# Patient Record
Sex: FEMALE | Race: WHITE | Employment: FULL TIME | URBAN - METROPOLITAN AREA
[De-identification: names, ages, dates, MRNs, and addresses within clinical notes are randomized per-mention and may not be internally consistent; named-entity substitution may affect disease eponyms.]

---

## 2018-05-28 ENCOUNTER — HOSPITAL ENCOUNTER (EMERGENCY)
Age: 23
Discharge: LEFT BEFORE DISCHARGE | End: 2018-05-28
Attending: INTERNAL MEDICINE
Payer: OTHER GOVERNMENT

## 2018-05-28 VITALS
BODY MASS INDEX: 24.84 KG/M2 | OXYGEN SATURATION: 100 % | DIASTOLIC BLOOD PRESSURE: 85 MMHG | HEART RATE: 85 BPM | HEIGHT: 62 IN | RESPIRATION RATE: 18 BRPM | SYSTOLIC BLOOD PRESSURE: 142 MMHG | WEIGHT: 135 LBS | TEMPERATURE: 97.9 F

## 2018-05-28 PROCEDURE — 75810000275 HC EMERGENCY DEPT VISIT NO LEVEL OF CARE

## 2018-05-28 RX ORDER — DEXTROAMPHETAMINE SACCHARATE, AMPHETAMINE ASPARTATE, DEXTROAMPHETAMINE SULFATE AND AMPHETAMINE SULFATE 7.5; 7.5; 7.5; 7.5 MG/1; MG/1; MG/1; MG/1
30 TABLET ORAL
COMMUNITY

## 2018-05-28 RX ORDER — BUPROPION HYDROCHLORIDE 150 MG/1
TABLET, EXTENDED RELEASE ORAL 2 TIMES DAILY
COMMUNITY

## 2018-10-04 LAB
ANTIBODY SCREEN, EXTERNAL: NEGATIVE
CHLAMYDIA, EXTERNAL: NEGATIVE
HBSAG, EXTERNAL: NEGATIVE
N. GONORRHEA, EXTERNAL: NEGATIVE
RPR, EXTERNAL: NON REACTIVE
RUBELLA, EXTERNAL: NORMAL

## 2019-03-11 LAB — GRBS, EXTERNAL: NEGATIVE

## 2019-03-28 ENCOUNTER — HOSPITAL ENCOUNTER (INPATIENT)
Age: 24
LOS: 2 days | Discharge: HOME | End: 2019-03-30
Attending: OBSTETRICS & GYNECOLOGY | Admitting: OBSTETRICS & GYNECOLOGY
Payer: OTHER GOVERNMENT

## 2019-03-28 PROBLEM — Z3A.38 38 WEEKS GESTATION OF PREGNANCY: Status: ACTIVE | Noted: 2019-03-28

## 2019-03-28 LAB
ABO + RH BLD: NORMAL
APPEARANCE UR: ABNORMAL
BASOPHILS # BLD: 0 K/UL (ref 0–0.1)
BASOPHILS NFR BLD: 0 % (ref 0–2)
BILIRUB UR QL: NEGATIVE
BLOOD GROUP ANTIBODIES SERPL: NORMAL
COLOR UR: ABNORMAL
DAILY QC (YES/NO)?: YES
DIFFERENTIAL METHOD BLD: ABNORMAL
EOSINOPHIL # BLD: 0 K/UL (ref 0–0.4)
EOSINOPHIL NFR BLD: 1 % (ref 0–5)
ERYTHROCYTE [DISTWIDTH] IN BLOOD BY AUTOMATED COUNT: 15.2 % (ref 11.6–14.5)
GLUCOSE UR QL STRIP.AUTO: NEGATIVE MG/DL
HCT VFR BLD AUTO: 33.9 % (ref 35–45)
HGB BLD-MCNC: 11.5 G/DL (ref 12–16)
KETONES UR-MCNC: 15 MG/DL
LEUKOCYTE ESTERASE UR QL STRIP: ABNORMAL
LYMPHOCYTES # BLD: 1.3 K/UL (ref 0.9–3.6)
LYMPHOCYTES NFR BLD: 16 % (ref 21–52)
MCH RBC QN AUTO: 30.6 PG (ref 24–34)
MCHC RBC AUTO-ENTMCNC: 33.9 G/DL (ref 31–37)
MCV RBC AUTO: 90.2 FL (ref 74–97)
MONOCYTES # BLD: 0.4 K/UL (ref 0.05–1.2)
MONOCYTES NFR BLD: 5 % (ref 3–10)
NEUTS SEG # BLD: 6.5 K/UL (ref 1.8–8)
NEUTS SEG NFR BLD: 78 % (ref 40–73)
NITRITE UR QL: NEGATIVE
PH UR: 7 [PH] (ref 5–9)
PH, VAGINAL FLUID: POSITIVE (ref 5–6.1)
PLATELET # BLD AUTO: 210 K/UL (ref 135–420)
PMV BLD AUTO: 12.3 FL (ref 9.2–11.8)
PROT UR QL: 30 MG/DL
RBC # BLD AUTO: 3.76 M/UL (ref 4.2–5.3)
RBC # UR STRIP: ABNORMAL /UL
SERVICE CMNT-IMP: ABNORMAL
SP GR UR: 1.02 (ref 1–1.02)
SPECIMEN EXP DATE BLD: NORMAL
UROBILINOGEN UR QL: 0.2 EU/DL (ref 0.2–1)
WBC # BLD AUTO: 8.3 K/UL (ref 4.6–13.2)

## 2019-03-28 PROCEDURE — 75410000003 HC RECOV DEL/VAG/CSECN EA 0.5 HR

## 2019-03-28 PROCEDURE — 74011250636 HC RX REV CODE- 250/636: Performed by: OBSTETRICS & GYNECOLOGY

## 2019-03-28 PROCEDURE — 65270000029 HC RM PRIVATE

## 2019-03-28 PROCEDURE — 86900 BLOOD TYPING SEROLOGIC ABO: CPT

## 2019-03-28 PROCEDURE — 75410000000 HC DELIVERY VAGINAL/SINGLE

## 2019-03-28 PROCEDURE — 83986 ASSAY PH BODY FLUID NOS: CPT | Performed by: OBSTETRICS & GYNECOLOGY

## 2019-03-28 PROCEDURE — 74011250636 HC RX REV CODE- 250/636

## 2019-03-28 PROCEDURE — 0HQ9XZZ REPAIR PERINEUM SKIN, EXTERNAL APPROACH: ICD-10-PCS | Performed by: OBSTETRICS & GYNECOLOGY

## 2019-03-28 PROCEDURE — 74011250637 HC RX REV CODE- 250/637: Performed by: OBSTETRICS & GYNECOLOGY

## 2019-03-28 PROCEDURE — 75410000002 HC LABOR FEE PER 1 HR

## 2019-03-28 PROCEDURE — 81003 URINALYSIS AUTO W/O SCOPE: CPT

## 2019-03-28 PROCEDURE — 85025 COMPLETE CBC W/AUTO DIFF WBC: CPT

## 2019-03-28 RX ORDER — AMOXICILLIN 250 MG
1 CAPSULE ORAL
Status: DISCONTINUED | OUTPATIENT
Start: 2019-03-28 | End: 2019-03-30 | Stop reason: HOSPADM

## 2019-03-28 RX ORDER — TERBUTALINE SULFATE 1 MG/ML
0.25 INJECTION SUBCUTANEOUS
Status: DISCONTINUED | OUTPATIENT
Start: 2019-03-28 | End: 2019-03-28 | Stop reason: HOSPADM

## 2019-03-28 RX ORDER — PROMETHAZINE HYDROCHLORIDE 25 MG/ML
25 INJECTION, SOLUTION INTRAMUSCULAR; INTRAVENOUS
Status: DISCONTINUED | OUTPATIENT
Start: 2019-03-28 | End: 2019-03-30 | Stop reason: HOSPADM

## 2019-03-28 RX ORDER — OXYCODONE AND ACETAMINOPHEN 5; 325 MG/1; MG/1
2 TABLET ORAL
Status: DISCONTINUED | OUTPATIENT
Start: 2019-03-28 | End: 2019-03-30 | Stop reason: HOSPADM

## 2019-03-28 RX ORDER — FENTANYL CITRATE 50 UG/ML
100 INJECTION, SOLUTION INTRAMUSCULAR; INTRAVENOUS ONCE
Status: DISCONTINUED | OUTPATIENT
Start: 2019-03-28 | End: 2019-03-28 | Stop reason: HOSPADM

## 2019-03-28 RX ORDER — FENTANYL/ROPIVACAINE/NS/PF 2MCG/ML-.1
PLASTIC BAG, INJECTION (ML) EPIDURAL
Status: DISPENSED
Start: 2019-03-28 | End: 2019-03-29

## 2019-03-28 RX ORDER — OXYTOCIN/RINGER'S LACTATE 20/1000 ML
125 PLASTIC BAG, INJECTION (ML) INTRAVENOUS CONTINUOUS
Status: DISCONTINUED | OUTPATIENT
Start: 2019-03-28 | End: 2019-03-28 | Stop reason: HOSPADM

## 2019-03-28 RX ORDER — FENTANYL/ROPIVACAINE/NS/PF 2MCG/ML-.1
1-15 PLASTIC BAG, INJECTION (ML) EPIDURAL
Status: DISCONTINUED | OUTPATIENT
Start: 2019-03-28 | End: 2019-03-28 | Stop reason: HOSPADM

## 2019-03-28 RX ORDER — HYDROCORTISONE 25 MG/G
CREAM TOPICAL AS NEEDED
Status: DISCONTINUED | OUTPATIENT
Start: 2019-03-28 | End: 2019-03-30 | Stop reason: HOSPADM

## 2019-03-28 RX ORDER — LIDOCAINE HYDROCHLORIDE 10 MG/ML
20 INJECTION, SOLUTION EPIDURAL; INFILTRATION; INTRACAUDAL; PERINEURAL AS NEEDED
Status: DISCONTINUED | OUTPATIENT
Start: 2019-03-28 | End: 2019-03-28 | Stop reason: HOSPADM

## 2019-03-28 RX ORDER — OXYTOCIN/RINGER'S LACTATE 20/1000 ML
999 PLASTIC BAG, INJECTION (ML) INTRAVENOUS ONCE
Status: DISCONTINUED | OUTPATIENT
Start: 2019-03-28 | End: 2019-03-28 | Stop reason: HOSPADM

## 2019-03-28 RX ORDER — METHYLERGONOVINE MALEATE 0.2 MG/ML
0.2 INJECTION INTRAVENOUS AS NEEDED
Status: DISCONTINUED | OUTPATIENT
Start: 2019-03-28 | End: 2019-03-28 | Stop reason: HOSPADM

## 2019-03-28 RX ORDER — PHENYLEPHRINE HCL IN 0.9% NACL 1 MG/10 ML
80 SYRINGE (ML) INTRAVENOUS AS NEEDED
Status: DISCONTINUED | OUTPATIENT
Start: 2019-03-28 | End: 2019-03-28 | Stop reason: HOSPADM

## 2019-03-28 RX ORDER — LIDOCAINE HYDROCHLORIDE 10 MG/ML
INJECTION INFILTRATION; PERINEURAL
Status: DISPENSED
Start: 2019-03-28 | End: 2019-03-29

## 2019-03-28 RX ORDER — BUTORPHANOL TARTRATE 2 MG/ML
2 INJECTION INTRAMUSCULAR; INTRAVENOUS
Status: DISCONTINUED | OUTPATIENT
Start: 2019-03-28 | End: 2019-03-28 | Stop reason: HOSPADM

## 2019-03-28 RX ORDER — ZOLPIDEM TARTRATE 5 MG/1
5 TABLET ORAL
Status: DISCONTINUED | OUTPATIENT
Start: 2019-03-28 | End: 2019-03-30 | Stop reason: HOSPADM

## 2019-03-28 RX ORDER — SODIUM CHLORIDE 0.9 % (FLUSH) 0.9 %
5-40 SYRINGE (ML) INJECTION AS NEEDED
Status: DISCONTINUED | OUTPATIENT
Start: 2019-03-28 | End: 2019-03-28 | Stop reason: HOSPADM

## 2019-03-28 RX ORDER — NALOXONE HYDROCHLORIDE 0.4 MG/ML
0.2 INJECTION, SOLUTION INTRAMUSCULAR; INTRAVENOUS; SUBCUTANEOUS AS NEEDED
Status: DISCONTINUED | OUTPATIENT
Start: 2019-03-28 | End: 2019-03-28 | Stop reason: HOSPADM

## 2019-03-28 RX ORDER — NALBUPHINE HYDROCHLORIDE 10 MG/ML
INJECTION, SOLUTION INTRAMUSCULAR; INTRAVENOUS; SUBCUTANEOUS
Status: COMPLETED
Start: 2019-03-28 | End: 2019-03-28

## 2019-03-28 RX ORDER — IBUPROFEN 400 MG/1
800 TABLET ORAL
Status: DISCONTINUED | OUTPATIENT
Start: 2019-03-28 | End: 2019-03-30 | Stop reason: HOSPADM

## 2019-03-28 RX ORDER — MINERAL OIL
30 OIL (ML) ORAL AS NEEDED
Status: COMPLETED | OUTPATIENT
Start: 2019-03-28 | End: 2019-03-28

## 2019-03-28 RX ORDER — FENTANYL CITRATE 50 UG/ML
INJECTION, SOLUTION INTRAMUSCULAR; INTRAVENOUS
Status: DISCONTINUED
Start: 2019-03-28 | End: 2019-03-28 | Stop reason: WASHOUT

## 2019-03-28 RX ORDER — ACETAMINOPHEN 325 MG/1
650 TABLET ORAL
Status: DISCONTINUED | OUTPATIENT
Start: 2019-03-28 | End: 2019-03-30 | Stop reason: HOSPADM

## 2019-03-28 RX ORDER — SODIUM CHLORIDE, SODIUM LACTATE, POTASSIUM CHLORIDE, CALCIUM CHLORIDE 600; 310; 30; 20 MG/100ML; MG/100ML; MG/100ML; MG/100ML
125 INJECTION, SOLUTION INTRAVENOUS CONTINUOUS
Status: DISCONTINUED | OUTPATIENT
Start: 2019-03-28 | End: 2019-03-28 | Stop reason: HOSPADM

## 2019-03-28 RX ORDER — HYDROMORPHONE HYDROCHLORIDE 1 MG/ML
1 INJECTION, SOLUTION INTRAMUSCULAR; INTRAVENOUS; SUBCUTANEOUS
Status: DISCONTINUED | OUTPATIENT
Start: 2019-03-28 | End: 2019-03-28 | Stop reason: HOSPADM

## 2019-03-28 RX ORDER — SODIUM CHLORIDE 0.9 % (FLUSH) 0.9 %
5-40 SYRINGE (ML) INJECTION EVERY 8 HOURS
Status: DISCONTINUED | OUTPATIENT
Start: 2019-03-28 | End: 2019-03-28 | Stop reason: HOSPADM

## 2019-03-28 RX ORDER — NALBUPHINE HYDROCHLORIDE 10 MG/ML
10 INJECTION, SOLUTION INTRAMUSCULAR; INTRAVENOUS; SUBCUTANEOUS
Status: DISCONTINUED | OUTPATIENT
Start: 2019-03-28 | End: 2019-03-28 | Stop reason: HOSPADM

## 2019-03-28 RX ORDER — OXYTOCIN/RINGER'S LACTATE 20/1000 ML
PLASTIC BAG, INJECTION (ML) INTRAVENOUS
Status: COMPLETED
Start: 2019-03-28 | End: 2019-03-28

## 2019-03-28 RX ADMIN — Medication 125 ML/HR: at 22:03

## 2019-03-28 RX ADMIN — NALBUPHINE HYDROCHLORIDE 10 MG: 10 INJECTION, SOLUTION INTRAMUSCULAR; INTRAVENOUS; SUBCUTANEOUS at 19:17

## 2019-03-28 RX ADMIN — BUTORPHANOL TARTRATE 2 MG: 2 INJECTION, SOLUTION INTRAMUSCULAR; INTRAVENOUS at 20:25

## 2019-03-28 RX ADMIN — Medication 30 ML: at 19:54

## 2019-03-28 RX ADMIN — Medication 125 ML/HR: at 20:35

## 2019-03-28 NOTE — PROGRESS NOTES
Bedside and Verbal shift change report given to Belinda Jorgensen RN (oncoming nurse) by Aydin Hunter (offgoing nurse). Report included the following information SBAR, Intake/Output, MAR and Recent Results. 1108 paged Dr. Josafat Dietznt for epidural  
 
2008  of viable male infant. Tactile stimulation, infant placed on mothers chest skin to skin, cord cut by family member.  delivery of placenta.  Patient request a breast pump at this time.  Patient up to restroom with minimal assistance, able to void, maninder care ice pack and pad applied, gown provided.  TRANSFER - OUT REPORT: 
 
Verbal report given to MARAL Livingston RN(name) on Marcia Mcguire  being transferred to Postpartum(unit) for routine progression of care Report consisted of patients Situation, Background, Assessment and  
Recommendations(SBAR). Information from the following report(s) SBAR, Intake/Output, MAR and Recent Results was reviewed with the receiving nurse. Lines:  
Peripheral IV 19 Left;Posterior;Proximal Forearm (Active) Site Assessment Clean, dry, & intact 3/28/2019  7:46 PM  
Phlebitis Assessment 0 3/28/2019  7:46 PM  
Infiltration Assessment 0 3/28/2019  7:46 PM  
Dressing Status Clean, dry, & intact 3/28/2019  7:46 PM  
Hub Color/Line Status Pink; Infusing;Patent 3/28/2019  7:46 PM  
Alcohol Cap Used Yes 3/28/2019  7:46 PM  
  
 
Opportunity for questions and clarification was provided. Patient transported with: 
 Registered Nurse

## 2019-03-28 NOTE — PROGRESS NOTES
1800  38+0 weeks gestation with c/o ROM and contractions.  SVE 4-5/90/-1 by this nurse, nitrazine positive.  Spoke with Dr. Miladis Devlin, aware of patient's vaginal exam. Orders rec'd to admit.  /-1 by this nurse 
 
 Bedside and Verbal shift change report given to Waldo Olivo RN (oncoming nurse) by Kathie Fam RN 
 (offgoing nurse). Report included the following information SBAR, Kardex, Intake/Output, MAR, Recent Results and Med Rec Status.

## 2019-03-29 LAB
HCT VFR BLD AUTO: 30.1 % (ref 35–45)
HGB BLD-MCNC: 10 G/DL (ref 12–16)

## 2019-03-29 PROCEDURE — 65270000029 HC RM PRIVATE

## 2019-03-29 PROCEDURE — 85018 HEMOGLOBIN: CPT

## 2019-03-29 PROCEDURE — 74011250637 HC RX REV CODE- 250/637: Performed by: OBSTETRICS & GYNECOLOGY

## 2019-03-29 PROCEDURE — 85014 HEMATOCRIT: CPT

## 2019-03-29 PROCEDURE — 36415 COLL VENOUS BLD VENIPUNCTURE: CPT

## 2019-03-29 RX ADMIN — IBUPROFEN 800 MG: 400 TABLET, FILM COATED ORAL at 05:11

## 2019-03-29 RX ADMIN — IBUPROFEN 800 MG: 400 TABLET, FILM COATED ORAL at 14:37

## 2019-03-29 NOTE — H&P
Ostetrical History and Physical 
Subjective:  
 
Date of Admission: 3/28/2019 Patient is a 20 y.o.   female admitted with SPROM with labor after, at 41 wks. For Obstetric history, see [place]. 
 
For Review of Systems, see prenatal 
 
Past Medical History:  
Diagnosis Date  Anxiety  Depression  Panic attacks  Psychiatric problem  Toxemia in pregnancy 2014  
 on Mag  
  
Past Surgical History:  
Procedure Laterality Date  HX GYN    
 hematoma after daughter was born  HX OTHER SURGICAL Left   
 left arm bone reset Prior to Admission medications Medication Sig Start Date End Date Taking? Authorizing Provider  
dextroamphetamine-amphetamine (ADDERALL) 30 mg tablet Take 30 mg by mouth. Yes Other, MD Saroj  
buPROPion SR (WELLBUTRIN SR) 150 mg SR tablet Take  by mouth two (2) times a day. Yes Other, MD Saroj  
acetaminophen (TYLENOL) 325 mg tablet Take 650 mg by mouth as needed for Pain. Yes Provider, Historical  
PRENATAL VITS W-CA,FE,FA,<1MG, (PRENATAL #2 PO) Take  by mouth. Yes Provider, Historical  
sertraline (ZOLOFT) 100 mg tablet Take 300 mg by mouth daily. Yes Provider, Historical  
 
Allergies Allergen Reactions  Ceclor [Cefaclor] Unknown (comments) Pt states she had a reaction as a child per her mother. Social History Tobacco Use  Smoking status: Never Smoker  Smokeless tobacco: Never Used Substance Use Topics  Alcohol use: No  
  
Family History Problem Relation Age of Onset  Heart Disease Maternal Grandmother  Heart Disease Maternal Grandfather  Diabetes Paternal Grandfather Objective:  
 
Blood pressure 143/72, pulse (!) 101, height 5' 3\" (1.6 m), weight 74.4 kg (164 lb), last menstrual period 2018, currently breastfeeding. No data recorded. No intake/output data recorded. No intake/output data recorded.  
 
HEENT: No pallor, no jaundice, Thyroid and throat normal 
 RESPIRATORY: Clear to A & P 
CVS: pulse reg, HS normal 
ABDOMEN: Gravid. Vertex. EFW=6lb +-1lb. No abnormal tenderness. Pelvic: Cervix 4, (by RN) Effaced: 100% Data Review:  
Recent Results (from the past 24 hour(s)) PH BY NITRAZINE, POC Collection Time: 03/28/19  6:30 PM  
Result Value Ref Range pH-Nitrazine paper Positive 5.0 - 6.1 Daily QC performed? Yes   
POC URINE MACROSCOPIC Collection Time: 03/28/19  6:32 PM  
Result Value Ref Range Color DARK YELLOW Appearance CLOUDY Spec. gravity (POC) 1.025 (H) 1.001 - 1.023    
 pH, urine  (POC) 7.0 5.0 - 9.0 Protein (POC) 30 (A) NEG mg/dL Glucose, urine (POC) NEGATIVE  NEG mg/dL Ketones (POC) 15 (A) NEG mg/dL Bilirubin (POC) NEGATIVE  NEG Blood (POC) MODERATE (A) NEG Urobilinogen (POC) 0.2 0.2 - 1.0 EU/dL Nitrite (POC) NEGATIVE  NEG Leukocyte esterase (POC) TRACE (A) NEG Performed by Amanda Roth CBC WITH AUTOMATED DIFF Collection Time: 03/28/19  6:55 PM  
Result Value Ref Range WBC 8.3 4.6 - 13.2 K/uL  
 RBC 3.76 (L) 4.20 - 5.30 M/uL  
 HGB 11.5 (L) 12.0 - 16.0 g/dL HCT 33.9 (L) 35.0 - 45.0 % MCV 90.2 74.0 - 97.0 FL  
 MCH 30.6 24.0 - 34.0 PG  
 MCHC 33.9 31.0 - 37.0 g/dL  
 RDW 15.2 (H) 11.6 - 14.5 % PLATELET 961 514 - 419 K/uL MPV 12.3 (H) 9.2 - 11.8 FL  
 NEUTROPHILS 78 (H) 40 - 73 % LYMPHOCYTES 16 (L) 21 - 52 % MONOCYTES 5 3 - 10 % EOSINOPHILS 1 0 - 5 % BASOPHILS 0 0 - 2 %  
 ABS. NEUTROPHILS 6.5 1.8 - 8.0 K/UL  
 ABS. LYMPHOCYTES 1.3 0.9 - 3.6 K/UL  
 ABS. MONOCYTES 0.4 0.05 - 1.2 K/UL  
 ABS. EOSINOPHILS 0.0 0.0 - 0.4 K/UL  
 ABS. BASOPHILS 0.0 0.0 - 0.1 K/UL  
 DF AUTOMATED    
TYPE & SCREEN Collection Time: 03/28/19  6:55 PM  
Result Value Ref Range Crossmatch Expiration 03/31/2019 ABO/Rh(D) A POSITIVE Antibody screen NEG Monitor:  Reactivity:present Variability:present Baseline:within normal limits Assessment:  
 
Active Problems: Premature rupture of membranes (PROM) affecting second pregnancy (8/27/2014) IUP (intrauterine pregnancy), incidental (8/27/2014) 38 weeks gestation of pregnancy (3/28/2019) Plan:  
 
 
Check labs:  CBC Check  Prenatal: 
 
Disposition:  
 
Type of admit:In-Patient I saw and examined patient. Signed By: Hank Rowe MD  
                      March 28, 2019

## 2019-03-29 NOTE — PROGRESS NOTES
0700 Bedside and Verbal shift change report given to Marques Duenas RN 
 (oncoming nurse) by Rut Lund RN (offgoing nurse). Report included the following information SBAR, Intake/Output, MAR and Recent Results. Pt assessment completed (see flowsheet). Encouraged patient to attempt to frequently empty bladder during shift. Pt verbalized understanding. Pt denies any additional needs at this time. Pt asleep in bed.  
 
1900 Bedside and Verbal shift change report given to Enrico Ceballos RN (oncoming nurse) by Marques Duenas RN 
 (offgoing nurse). Report included the following information SBAR, Intake/Output, MAR and Recent Results.

## 2019-03-29 NOTE — LACTATION NOTE
Mom currently pumping small amounts and concerned that she doesn't have enough milk. Discussed WNL and that supplementing is not medically necessary at this time. Mom educated on breastfeeding basics--hunger cues, feeding on demand, waking baby if baby sleeps too long between feeds, importance of skin to skin, positioning and latching, risk of pacifier use and supplemental feedings, and importance of rooming in--and use of log sheet. Mom also educated on benefits of breastfeeding for herself and baby. Mom verbalized understanding. Mom to page when ready to feed. No questions at this time.

## 2019-03-29 NOTE — PROGRESS NOTES
Bedside and Verbal shift change report given to Merly Carroll RN by Bradley Woodard RN. Report included the following information SBAR, Kardex, OR Summary, Intake/Output and MAR.

## 2019-03-29 NOTE — H&P
Vaginal Delivery Procedure Note Name: Naomie Birch Medical Record Number: [id number] YOB: 1995 Today's Date: 2019 Procedure: VAGINAL DELIVERY without suction or forceps Anesthesia:  Local for perineum Extra Procedure Details:  no 
  
Estimated Blood Loss: 300 Fetal Description: wright male Anterior shoulder: right Umbilical Cord: 3 vessels present Episiotomy: no  
Tear: yesType:midline (reopened previous tear) Degree: 1st degree Repair:   2/0 cc Cord Blood Results:  
Information for the patient's :  Phyllis Lerma [[id number]] @[facility]@ Birth Information:  
Information for the patient's :  Phyllis Lerma [[id number]] Specimens: Placenta was not sent Placenta:    Spontaneous with assist and apparently intact on exam 
       
Complications:  none Birth Weight: see baby part of chart Mother's Condition: good Baby's Condition: good Sponge and needle count:    correct I was present for the delivery. Signed: Henry Larry MD  
   2019

## 2019-03-29 NOTE — PROGRESS NOTES
Assessment complete at this time. Oriented to room. Mom updated on plan of care for the night. No questions/concerns. Call bell left in reach.

## 2019-03-29 NOTE — PROGRESS NOTES
Problem: Vaginal Delivery: Day of Delivery-Post delivery Goal: Activity/Safety Outcome: Progressing Towards Goal 
Goal: Nutrition/Diet Outcome: Progressing Towards Goal 
Goal: Medications Outcome: Progressing Towards Goal 
Goal: *Vital signs within defined limits Outcome: Progressing Towards Goal 
Goal: *Labs within defined limits Outcome: Progressing Towards Goal 
Goal: *Hemodynamically stable Outcome: Progressing Towards Goal 
Goal: *Optimal pain control at patient's stated goal 
Outcome: Progressing Towards Goal 
Goal: *Participates in infant care Outcome: Progressing Towards Goal

## 2019-03-29 NOTE — PROGRESS NOTES
TRANSFER - IN REPORT: 
 
Verbal report received from MATTHEW Carpenter RN(name) on Sudie Ranks  being received from Labor and Delivery(unit) for routine progression of care Report consisted of patients Situation, Background, Assessment and  
Recommendations(SBAR). Information from the following report(s) SBAR, Kardex, Intake/Output, MAR and Recent Results was reviewed with the receiving nurse. Opportunity for questions and clarification was provided. 0600- charting reviewed for NUBIA Shepard

## 2019-03-29 NOTE — PROGRESS NOTES
Progress Note Patient: Lili Gowers MRN: [id number]  SSN: [id number] YOB: 1995  Age: 20 y.o. Sex: female ROOM:  250/01 Subjective:  
 
Postpartum Day: 1 Delivery: vaginal delivery The patient feels well. The patient denies emotional concerns. The baby is well. Baby is feeding via breast.  The patient is ambulating well. The patient  tolerating a normal diet. Flatus has been passed. Objective:  
  
Patient Vitals for the past 24 hrs: 
 BP Temp Pulse Resp SpO2 Height Weight  
03/28/19 2320 131/72 98 °F (36.7 °C) 97 18 99 %    
03/28/19 2142 125/76  99      
03/28/19 2130 136/85  (!) 126      
03/28/19 2101 121/76  (!) 115      
03/28/19 2030 128/81  (!) 105      
03/28/19 2028 123/83  100      
03/28/19 1925 143/72  (!) 101      
03/28/19 1830 130/89  (!) 113      
03/28/19 1825 133/87     5' 3\" (1.6 m) 74.4 kg (164 lb) Lochia:  appropriate Uterine Fundus:   firm Fundus Location:  -2 Incision: DVT Evaluation:  No evidence of DVT seen on physical exam. 
Negative Quin's sign. No cords or calf tenderness. No significant calf/ankle edema. Lab/Data Review: All lab results for the last 24 hours reviewed. LABS: Recent Results (from the past 48 hour(s)) PH BY NITRAZINE, POC Collection Time: 03/28/19  6:30 PM  
Result Value Ref Range pH-Nitrazine paper Positive 5.0 - 6.1 Daily QC performed? Yes   
POC URINE MACROSCOPIC Collection Time: 03/28/19  6:32 PM  
Result Value Ref Range Color DARK YELLOW Appearance CLOUDY Spec. gravity (POC) 1.025 (H) 1.001 - 1.023    
 pH, urine  (POC) 7.0 5.0 - 9.0 Protein (POC) 30 (A) NEG mg/dL Glucose, urine (POC) NEGATIVE  NEG mg/dL Ketones (POC) 15 (A) NEG mg/dL Bilirubin (POC) NEGATIVE  NEG Blood (POC) MODERATE (A) NEG Urobilinogen (POC) 0.2 0.2 - 1.0 EU/dL  Nitrite (POC) NEGATIVE  NEG    
 Leukocyte esterase (POC) TRACE (A) NEG Performed by Arleth Souza CBC WITH AUTOMATED DIFF Collection Time: 03/28/19  6:55 PM  
Result Value Ref Range WBC 8.3 4.6 - 13.2 K/uL  
 RBC 3.76 (L) 4.20 - 5.30 M/uL  
 HGB 11.5 (L) 12.0 - 16.0 g/dL HCT 33.9 (L) 35.0 - 45.0 % MCV 90.2 74.0 - 97.0 FL  
 MCH 30.6 24.0 - 34.0 PG  
 MCHC 33.9 31.0 - 37.0 g/dL  
 RDW 15.2 (H) 11.6 - 14.5 % PLATELET 534 923 - 177 K/uL MPV 12.3 (H) 9.2 - 11.8 FL  
 NEUTROPHILS 78 (H) 40 - 73 % LYMPHOCYTES 16 (L) 21 - 52 % MONOCYTES 5 3 - 10 % EOSINOPHILS 1 0 - 5 % BASOPHILS 0 0 - 2 %  
 ABS. NEUTROPHILS 6.5 1.8 - 8.0 K/UL  
 ABS. LYMPHOCYTES 1.3 0.9 - 3.6 K/UL  
 ABS. MONOCYTES 0.4 0.05 - 1.2 K/UL  
 ABS. EOSINOPHILS 0.0 0.0 - 0.4 K/UL  
 ABS. BASOPHILS 0.0 0.0 - 0.1 K/UL  
 DF AUTOMATED    
TYPE & SCREEN Collection Time: 03/28/19  6:55 PM  
Result Value Ref Range Crossmatch Expiration 03/31/2019 ABO/Rh(D) A POSITIVE Antibody screen NEG   
HEMATOCRIT Collection Time: 03/29/19  3:00 AM  
Result Value Ref Range HCT 30.1 (L) 35.0 - 45.0 % HEMOGLOBIN Collection Time: 03/29/19  3:00 AM  
Result Value Ref Range HGB 10.0 (L) 12.0 - 16.0 g/dL Assessment:  
 
Status post: Doing well postpartum vaginal delivery Plan:  
 
Postpartum care discussed including diet, ambulation, and actvitiy restrictions. Discussed circumcision: Benefits are that it may be easier to keep clean and various ethnic and Holiness customs. Risks are bleeding most commonly, which is resolved with pressure or hemostatic gels or sponges. Scarring and infection are possible but extremely unlikely. It may not be be possible to remove all that we would like to remove if the shaft is short, as this would be more likely to lead to scarring. Wants circ done. Discharge instructions and questions answered. Signed By: Gordo Kincaid MD   
 March 29, 2019

## 2019-03-30 VITALS
HEIGHT: 63 IN | SYSTOLIC BLOOD PRESSURE: 122 MMHG | OXYGEN SATURATION: 100 % | TEMPERATURE: 99.2 F | BODY MASS INDEX: 29.06 KG/M2 | DIASTOLIC BLOOD PRESSURE: 81 MMHG | RESPIRATION RATE: 16 BRPM | HEART RATE: 101 BPM | WEIGHT: 164 LBS

## 2019-03-30 PROBLEM — Z3A.38 38 WEEKS GESTATION OF PREGNANCY: Status: RESOLVED | Noted: 2019-03-28 | Resolved: 2019-03-30

## 2019-03-30 NOTE — PROGRESS NOTES
Patient discharged to home with infant, Discharge instructions reviewed [facility] for well baby appointment for early in the week. Telephone number provided. Patient to follow up with [facility], in 6 weeks. Transferred to the front entrance of the hospital via wheelchair accompanied by her  with infant in patients arms.

## 2019-03-30 NOTE — DISCHARGE SUMMARY
Obstetrical Discharge Summary     Name: Reece Reilly MRN: [id number]  SSN: [id number]    YOB: 1995  Age: 20 y.o. Sex: female      Admit Date: 3/28/2019    Discharge Date: 3/30/2019    Admitting Physician: Adrienne Crespo MD     Attending Physician:  Bin Robertson MD     Discharge Diagnoses:   Information for the patient's :  Kp Be [[id number]]   Delivery of a 6 lb 2.8 oz (2.8 kg) male infant via Vaginal, Spontaneous on 3/28/2019 at 8:06 PM  by . Apgars were 8 and 8. Additional Diagnoses:   Problem List as of 3/30/2019 Date Reviewed: 3/30/2019          Codes Class Noted - Resolved    Spontaneous vaginal delivery ICD-10-CM: O80  ICD-9-CM: 650  3/30/2019 - Present        Perineal laceration, first degree, delivered ICD-10-CM: O70.0  ICD-9-CM: 664.01  3/30/2019 - Present        Vaginal hematoma ICD-10-CM: N89.8  ICD-9-CM: 623.6  2014 - Present        RESOLVED: 38 weeks gestation of pregnancy ICD-10-CM: Z3A.38  ICD-9-CM: V22.2  3/28/2019 - 3/30/2019        RESOLVED: Premature rupture of membranes (PROM) affecting second pregnancy ICD-10-CM: O42.90  ICD-9-CM: 658.10  2014 - 3/30/2019        RESOLVED: Toxemia in pregnancy ICD-10-CM: O14.90  ICD-9-CM: 642.40  2014 - 3/30/2019        RESOLVED: IUP (intrauterine pregnancy), incidental ICD-10-CM: Z34.90  ICD-9-CM: V22.2  2014 - 3/30/2019        RESOLVED:  uterine contractions, antepartum ICD-10-CM: O47.00  ICD-9-CM: 644.03  2014 - 3/30/2019              Lab Results   Component Value Date/Time    Rubella, External immune 10/04/2018    GrBStrep, External negative  2019     Recent Labs     19  0300   HGB 10.0*       Hospital Course: Normal hospital course following the delivery. Patient Instructions:   Current Discharge Medication List      CONTINUE these medications which have NOT CHANGED    Details   dextroamphetamine-amphetamine (ADDERALL) 30 mg tablet Take 30 mg by mouth. buPROPion SR (WELLBUTRIN SR) 150 mg SR tablet Take  by mouth two (2) times a day. acetaminophen (TYLENOL) 325 mg tablet Take 650 mg by mouth as needed for Pain. PRENATAL VITS W-CA,FE,FA,<1MG, (PRENATAL #2 PO) Take  by mouth. sertraline (ZOLOFT) 100 mg tablet Take 300 mg by mouth daily. Reference my discharge instructions.     Follow-up Appointments   Procedures    FOLLOW UP VISIT Appointment in: 6 Weeks     Standing Status:   Standing     Number of Occurrences:   1     Order Specific Question:   Appointment in     Answer:   6 Weeks        Signed By:  Claudetta Brandy, MD     March 30, 2019                       BST

## 2019-03-30 NOTE — PROGRESS NOTES
Post-Partum Day Number 2 Progress Note Gaviota Reilly Assessment:  
Hospital Problems  Date Reviewed: 3/30/2019 Codes Class Noted POA Spontaneous vaginal delivery ICD-10-CM: O80 
ICD-9-CM: 274  3/30/2019 Unknown Perineal laceration, first degree, delivered ICD-10-CM: O70.0 ICD-9-CM: 664.01  3/30/2019 No  
   
  
 
Doing well, post partum day 2 Plan: 1. Discharge home today 2. Follow up in office in 6 weeks with Ruben Castillo MD  
3. Post partum activity advised, diet as tolerated 4. Discharge Medications: medications prior to admission Information for the patient's :  Мария Lozano [[id number]] Vaginal, Spontaneous Patient doing well without significant complaint. Voiding without difficulty, normal lochia. Declined need for pain medication for discharge. Current Facility-Administered Medications Medication Dose Route Frequency Vitals: 
Visit Vitals /81 (BP 1 Location: Left arm, BP Patient Position: At rest) Pulse (!) 101 Temp 99.2 °F (37.3 °C) Resp 16 Ht 5' 3\" (1.6 m) Wt 164 lb (74.4 kg) LMP 2018 SpO2 100% Breastfeeding? Unknown BMI 29.05 kg/m² Temp (24hrs), Av.4 °F (36.9 °C), Min:98 °F (36.7 °C), Max:99.2 °F (37.3 °C) Exam:    
    Patient without distress. Abdomen soft, fundus firm, nontender Lower extremities are negative for swelling, cords or tenderness. Labs:  
 
Lab Results Component Value Date/Time  WBC 8.3 2019 06:55 PM  
 WBC 12.2 2014 04:40 PM  
 WBC 11.4 2014 02:05 AM  
 HGB 10.0 (L) 2019 03:00 AM  
 HGB 11.5 (L) 2019 06:55 PM  
 HGB 9.2 (L) 2014 03:25 AM  
 HCT 30.1 (L) 2019 03:00 AM  
 HCT 33.9 (L) 2019 06:55 PM  
 HCT 25.9 (L) 2014 03:25 AM  
 PLATELET 283  06:55 PM  
 PLATELET 975  04:40 PM  
 PLATELET 584 (L)  02:05 AM  
 
 
 No results found for this or any previous visit (from the past 24 hour(s)).

## 2019-03-30 NOTE — DISCHARGE INSTRUCTIONS
POST DELIVERY DISCHARGE INSTRUCTIONS    Name: Jolene Galindo  YOB: 1995  Primary Diagnosis: Active Problems:    Spontaneous vaginal delivery (3/30/2019)      Perineal laceration, first degree, delivered (3/30/2019)        General:     Diet/Diet Restrictions:  Eight 8-ounce glasses of fluid daily (water, juices); avoid excessive caffeine intake. Meals/snacks as desired which are high in fiber and carbohydrates and low in fat and cholesterol. Physical Activity / Restrictions / Safety:     Avoid heavy lifting, no more that 8 lbs. For 2-3 weeks; Avoid intercourse 4-6 weeks, no douching or tampon use. Check with obstetrician before starting or resuming an exercise program. Follow up with [facility]. For six week post-partum check-up        Discharge Instructions/Special Treatment/Home Care Needs:     Continue prenatal vitamins. Continue to use squirt bottle with warm water on your episiotomy after each bathroom use until bleeding stops. If steri-strips applied to your incision, remove in 7-10 days. Call your doctor for the following:     Fever over 100.4 degrees by mouth. Vaginal bleeding heavier than a normal menstrual period or clot larger than a golf ball. Red streaks or increased swelling of legs, painful red streaks on your breast.  Painful urination, constipation and increased pain or swelling or discharge with your incision. If you feel extremely anxious or overwhelmed. If you have thoughts of harming yourself and/or your baby. Pain Management:     Pain Management:   Take Acetaminophen (Tylenol) or Ibuprofen (Advil, Motrin), as directed for pain. Use a warm Sitz bath 3 times daily to relieve episiotomy or hemorrhoidal discomfort. For hemorrhoidal discomfort, use Tucks and Anusol cream as needed and directed. Follow-Up Care:      These are general instructions for a healthy lifestyle:    No smoking/ No tobacco products/ Avoid exposure to second hand smoke    Surgeon Eloisa Moreno Warning:  Quitting smoking now greatly reduces serious risk to your health. Obesity, smoking, and sedentary lifestyle greatly increases your risk for illness    A healthy diet, regular physical exercise & weight monitoring are important for maintaining a healthy lifestyle    Recognize signs and symptoms of STROKE:    F-face looks uneven    A-arms unable to move or move unevenly    S-speech slurred or non-existent    T-time-call 911 as soon as signs and symptoms begin-DO NOT go       Back to bed or wait to see if you get better-TIME IS BRAIN.         Signed By: Pelon Berumen RN                                                                                                   Date: 3/30/2019 Time: 12:39 PM    Patient armband removed and shredded

## 2023-07-21 ENCOUNTER — HOSPITAL ENCOUNTER (EMERGENCY)
Facility: HOSPITAL | Age: 28
Discharge: HOME | End: 2023-07-21
Payer: COMMERCIAL

## 2023-07-21 ENCOUNTER — APPOINTMENT (OUTPATIENT)
Facility: HOSPITAL | Age: 28
End: 2023-07-21
Payer: COMMERCIAL

## 2023-07-21 VITALS
HEIGHT: 63 IN | WEIGHT: 199 LBS | TEMPERATURE: 98.3 F | HEART RATE: 88 BPM | BODY MASS INDEX: 35.26 KG/M2 | RESPIRATION RATE: 17 BRPM | OXYGEN SATURATION: 100 % | DIASTOLIC BLOOD PRESSURE: 71 MMHG | SYSTOLIC BLOOD PRESSURE: 147 MMHG

## 2023-07-21 DIAGNOSIS — R03.0 ELEVATED BLOOD PRESSURE READING IN OFFICE WITHOUT DIAGNOSIS OF HYPERTENSION: ICD-10-CM

## 2023-07-21 DIAGNOSIS — B96.89 BACTERIAL VAGINOSIS: ICD-10-CM

## 2023-07-21 DIAGNOSIS — N76.0 BACTERIAL VAGINOSIS: ICD-10-CM

## 2023-07-21 DIAGNOSIS — R19.5 FECAL OCCULT BLOOD TEST POSITIVE: ICD-10-CM

## 2023-07-21 DIAGNOSIS — R10.13 ABDOMINAL PAIN, EPIGASTRIC: Primary | ICD-10-CM

## 2023-07-21 DIAGNOSIS — K92.0 HEMATEMESIS WITH NAUSEA: ICD-10-CM

## 2023-07-21 LAB
ABO + RH BLD: NORMAL
ALBUMIN SERPL-MCNC: 4.2 G/DL (ref 3.4–5)
ALBUMIN/GLOB SERPL: 1.1 (ref 0.8–1.7)
ALP SERPL-CCNC: 72 U/L (ref 45–117)
ALT SERPL-CCNC: 54 U/L (ref 13–56)
AMORPH CRY URNS QL MICRO: ABNORMAL
ANION GAP SERPL CALC-SCNC: 4 MMOL/L (ref 3–18)
APPEARANCE UR: ABNORMAL
APTT PPP: 26.1 SEC (ref 23–36.4)
AST SERPL-CCNC: 24 U/L (ref 10–38)
BACTERIA URNS QL MICRO: ABNORMAL /HPF
BASOPHILS # BLD: 0 K/UL (ref 0–0.1)
BASOPHILS NFR BLD: 0 % (ref 0–2)
BILIRUB SERPL-MCNC: 0.5 MG/DL (ref 0.2–1)
BILIRUB UR QL: ABNORMAL
BLOOD GROUP ANTIBODIES SERPL: NORMAL
BUN SERPL-MCNC: 13 MG/DL (ref 7–18)
BUN/CREAT SERPL: 15 (ref 12–20)
CALCIUM SERPL-MCNC: 9.7 MG/DL (ref 8.5–10.1)
CHLORIDE SERPL-SCNC: 104 MMOL/L (ref 100–111)
CO2 SERPL-SCNC: 30 MMOL/L (ref 21–32)
COLOR UR: ABNORMAL
CREAT SERPL-MCNC: 0.86 MG/DL (ref 0.6–1.3)
DIFFERENTIAL METHOD BLD: ABNORMAL
EKG ATRIAL RATE: 86 BPM
EKG DIAGNOSIS: NORMAL
EKG P AXIS: 42 DEGREES
EKG P-R INTERVAL: 118 MS
EKG Q-T INTERVAL: 364 MS
EKG QRS DURATION: 80 MS
EKG QTC CALCULATION (BAZETT): 435 MS
EKG R AXIS: 36 DEGREES
EKG T AXIS: 37 DEGREES
EKG VENTRICULAR RATE: 86 BPM
EOSINOPHIL # BLD: 0 K/UL (ref 0–0.4)
EOSINOPHIL NFR BLD: 0 % (ref 0–5)
EPITH CASTS URNS QL MICRO: ABNORMAL /LPF (ref 0–5)
ERYTHROCYTE [DISTWIDTH] IN BLOOD BY AUTOMATED COUNT: 12.5 % (ref 11.6–14.5)
GLOBULIN SER CALC-MCNC: 3.7 G/DL (ref 2–4)
GLUCOSE SERPL-MCNC: 104 MG/DL (ref 74–99)
GLUCOSE UR STRIP.AUTO-MCNC: NEGATIVE MG/DL
HCG UR QL: NEGATIVE
HCT VFR BLD AUTO: 40.7 % (ref 35–45)
HEMOCCULT STL QL: POSITIVE
HGB BLD-MCNC: 14.2 G/DL (ref 12–16)
HGB UR QL STRIP: NEGATIVE
IMM GRANULOCYTES # BLD AUTO: 0.1 K/UL (ref 0–0.04)
IMM GRANULOCYTES NFR BLD AUTO: 1 % (ref 0–0.5)
INR PPP: 0.9 (ref 0.8–1.2)
KETONES UR QL STRIP.AUTO: 40 MG/DL
LEUKOCYTE ESTERASE UR QL STRIP.AUTO: ABNORMAL
LIPASE SERPL-CCNC: 82 U/L (ref 73–393)
LYMPHOCYTES # BLD: 1.7 K/UL (ref 0.9–3.6)
LYMPHOCYTES NFR BLD: 15 % (ref 21–52)
MCH RBC QN AUTO: 32.8 PG (ref 24–34)
MCHC RBC AUTO-ENTMCNC: 34.9 G/DL (ref 31–37)
MCV RBC AUTO: 94 FL (ref 78–100)
MONOCYTES # BLD: 0.7 K/UL (ref 0.05–1.2)
MONOCYTES NFR BLD: 6 % (ref 3–10)
MUCOUS THREADS URNS QL MICRO: ABNORMAL /LPF
NEUTS SEG # BLD: 9.2 K/UL (ref 1.8–8)
NEUTS SEG NFR BLD: 79 % (ref 40–73)
NITRITE UR QL STRIP.AUTO: NEGATIVE
NRBC # BLD: 0 K/UL (ref 0–0.01)
NRBC BLD-RTO: 0 PER 100 WBC
PH UR STRIP: 5.5 (ref 5–8)
PLATELET # BLD AUTO: 255 K/UL (ref 135–420)
PMV BLD AUTO: 10.2 FL (ref 9.2–11.8)
POTASSIUM SERPL-SCNC: 3.9 MMOL/L (ref 3.5–5.5)
PROT SERPL-MCNC: 7.9 G/DL (ref 6.4–8.2)
PROT UR STRIP-MCNC: 30 MG/DL
PROTHROMBIN TIME: 12.7 SEC (ref 11.5–15.2)
RBC # BLD AUTO: 4.33 M/UL (ref 4.2–5.3)
RBC #/AREA URNS HPF: ABNORMAL /HPF (ref 0–5)
SERVICE CMNT-IMP: NORMAL
SODIUM SERPL-SCNC: 138 MMOL/L (ref 136–145)
SP GR UR REFRACTOMETRY: >1.03 (ref 1–1.03)
SPECIMEN EXP DATE BLD: NORMAL
UROBILINOGEN UR QL STRIP.AUTO: 1 EU/DL (ref 0.2–1)
WBC # BLD AUTO: 11.7 K/UL (ref 4.6–13.2)
WBC URNS QL MICRO: ABNORMAL /HPF (ref 0–5)
WET PREP GENITAL: NORMAL

## 2023-07-21 PROCEDURE — 2580000003 HC RX 258: Performed by: NURSE PRACTITIONER

## 2023-07-21 PROCEDURE — 81001 URINALYSIS AUTO W/SCOPE: CPT

## 2023-07-21 PROCEDURE — 85610 PROTHROMBIN TIME: CPT

## 2023-07-21 PROCEDURE — 87186 SC STD MICRODIL/AGAR DIL: CPT

## 2023-07-21 PROCEDURE — 71045 X-RAY EXAM CHEST 1 VIEW: CPT

## 2023-07-21 PROCEDURE — 80053 COMPREHEN METABOLIC PANEL: CPT

## 2023-07-21 PROCEDURE — 87086 URINE CULTURE/COLONY COUNT: CPT

## 2023-07-21 PROCEDURE — 86901 BLOOD TYPING SEROLOGIC RH(D): CPT

## 2023-07-21 PROCEDURE — 87661 TRICHOMONAS VAGINALIS AMPLIF: CPT

## 2023-07-21 PROCEDURE — 87210 SMEAR WET MOUNT SALINE/INK: CPT

## 2023-07-21 PROCEDURE — 6360000002 HC RX W HCPCS: Performed by: NURSE PRACTITIONER

## 2023-07-21 PROCEDURE — 85730 THROMBOPLASTIN TIME PARTIAL: CPT

## 2023-07-21 PROCEDURE — 93005 ELECTROCARDIOGRAM TRACING: CPT | Performed by: NURSE PRACTITIONER

## 2023-07-21 PROCEDURE — 96374 THER/PROPH/DIAG INJ IV PUSH: CPT

## 2023-07-21 PROCEDURE — 86900 BLOOD TYPING SEROLOGIC ABO: CPT

## 2023-07-21 PROCEDURE — 99285 EMERGENCY DEPT VISIT HI MDM: CPT

## 2023-07-21 PROCEDURE — 81025 URINE PREGNANCY TEST: CPT

## 2023-07-21 PROCEDURE — 86677 HELICOBACTER PYLORI ANTIBODY: CPT

## 2023-07-21 PROCEDURE — 83690 ASSAY OF LIPASE: CPT

## 2023-07-21 PROCEDURE — 86850 RBC ANTIBODY SCREEN: CPT

## 2023-07-21 PROCEDURE — 87591 N.GONORRHOEAE DNA AMP PROB: CPT

## 2023-07-21 PROCEDURE — 82272 OCCULT BLD FECES 1-3 TESTS: CPT

## 2023-07-21 PROCEDURE — 87491 CHLMYD TRACH DNA AMP PROBE: CPT

## 2023-07-21 PROCEDURE — 87077 CULTURE AEROBIC IDENTIFY: CPT

## 2023-07-21 PROCEDURE — 85025 COMPLETE CBC W/AUTO DIFF WBC: CPT

## 2023-07-21 RX ORDER — METRONIDAZOLE 7.5 MG/G
1 GEL VAGINAL NIGHTLY
Qty: 1 EACH | Refills: 0 | Status: SHIPPED | OUTPATIENT
Start: 2023-07-21 | End: 2023-07-26

## 2023-07-21 RX ORDER — 0.9 % SODIUM CHLORIDE 0.9 %
1000 INTRAVENOUS SOLUTION INTRAVENOUS ONCE
Status: COMPLETED | OUTPATIENT
Start: 2023-07-21 | End: 2023-07-21

## 2023-07-21 RX ORDER — ONDANSETRON 2 MG/ML
4 INJECTION INTRAMUSCULAR; INTRAVENOUS ONCE
Status: COMPLETED | OUTPATIENT
Start: 2023-07-21 | End: 2023-07-21

## 2023-07-21 RX ORDER — OMEPRAZOLE 40 MG/1
40 CAPSULE, DELAYED RELEASE ORAL
Qty: 30 CAPSULE | Refills: 0 | Status: SHIPPED | OUTPATIENT
Start: 2023-07-21

## 2023-07-21 RX ORDER — IBUPROFEN 200 MG
200 TABLET ORAL EVERY 6 HOURS PRN
COMMUNITY

## 2023-07-21 RX ADMIN — SODIUM CHLORIDE 1000 ML: 9 INJECTION, SOLUTION INTRAVENOUS at 16:56

## 2023-07-21 RX ADMIN — ONDANSETRON 4 MG: 2 INJECTION INTRAMUSCULAR; INTRAVENOUS at 16:56

## 2023-07-21 ASSESSMENT — PAIN - FUNCTIONAL ASSESSMENT: PAIN_FUNCTIONAL_ASSESSMENT: 0-10

## 2023-07-21 ASSESSMENT — PAIN SCALES - GENERAL: PAINLEVEL_OUTOF10: 4

## 2023-07-21 NOTE — ED NOTES
The following labs were labeled with appropriate pt sticker and tubed to lab:     [x] Blue     [x] Lavender   [] on ice  [x] Green/yellow  [] Green/black [] on ice  [] Eugenie Mayo  [] on ice  [x] Yellow  [] Red  [x] Type/ Screen  [] ABG  [] VBG    [] COVID-19 swab    [] Rapid  [] PCR  [] Flu swab  [] Peds Viral Panel     [] Urine Sample  [x] Occult Sample  [] Pelvic Cultures  [] Blood Cultures  [] X 2  [] STREP Cultures       Alice Parsons RN  07/21/23 [id number]

## 2023-07-21 NOTE — ED PROVIDER NOTES
[facility] EMERGENCY DEPT  EMERGENCY DEPARTMENT ENCOUNTER       Pt Name: Davis Malhotra  MRN: [id number]  [address] [facility] 1995  Date of evaluation: 7/21/2023  PCP: No primary care provider on file. Note Started: 7:34 PM 7/21/23     CHIEF COMPLAINT       Chief Complaint   Patient presents with    Emesis     Pt c/o coffee ground emesis and black stool since this AM     Melena        HISTORY OF PRESENT ILLNESS: 1 or more elements      History From: Patient  HPI Limitations: None  Chronic Conditions: Depression, anxiety  Social Determinants affecting Dx or Tx: None     Davis Malhotra is a 25 y.o. female who presents to ED c/o coffee-ground emesis, black diarrhea and epigastric pain. Patient reports coffee-ground or any first episode of emesis this morning. Patient states she did take 1 ibuprofen after vomiting began for pain but denies regular use before, patient denies steroid use, history of GERD, ulcers. Patient reports multiple episodes emesis of coffee-ground appearance, describes stool as loose and black but not tarry or sticky. Patient is not active vomiting at the time, reports ongoing nausea. Patient denies chest pain, shortness of breath, focal weakness, paresthesias, unusual bleeding or bruising, fever/chills. Patient does endorse headache and lightheadedness. Patient reports LMP was approximately 1 month ago. Nursing Notes were all reviewed and agreed with or any disagreements were addressed in the HPI. PAST HISTORY     Past Medical History:  History reviewed. No pertinent past medical history. Past Surgical History:  History reviewed. No pertinent surgical history. Family History:  History reviewed. No pertinent family history.     Social History:  Social History     Socioeconomic History    Marital status:      Spouse name: None    Number of children: None    Years of education: None    Highest education level: None   Tobacco Use    Smoking status: Never    Smokeless tobacco: (L) 21 - 52 %    Monocytes % 6 3 - 10 %    Eosinophils % 0 0 - 5 %    Basophils % 0 0 - 2 %    Immature Granulocytes 1 (H) 0.0 - 0.5 %    Neutrophils Absolute 9.2 (H) 1.8 - 8.0 K/UL    Lymphocytes Absolute 1.7 0.9 - 3.6 K/UL    Monocytes Absolute 0.7 0.05 - 1.2 K/UL    Eosinophils Absolute 0.0 0.0 - 0.4 K/UL    Basophils Absolute 0.0 0.0 - 0.1 K/UL    Absolute Immature Granulocyte 0.1 (H) 0.00 - 0.04 K/UL    Differential Type AUTOMATED     Comprehensive Metabolic Panel    Collection Time: 07/21/23  4:40 PM   Result Value Ref Range    Sodium 138 136 - 145 mmol/L    Potassium 3.9 3.5 - 5.5 mmol/L    Chloride 104 100 - 111 mmol/L    CO2 30 21 - 32 mmol/L    Anion Gap 4 3.0 - 18 mmol/L    Glucose 104 (H) 74 - 99 mg/dL    BUN 13 7.0 - 18 MG/DL    Creatinine 0.86 0.6 - 1.3 MG/DL    Bun/Cre Ratio 15 12 - 20      Est, Glom Filt Rate >60 >60 ml/min/1.73m2    Calcium 9.7 8.5 - 10.1 MG/DL    Total Bilirubin 0.5 0.2 - 1.0 MG/DL    ALT 54 13 - 56 U/L    AST 24 10 - 38 U/L    Alk Phosphatase 72 45 - 117 U/L    Total Protein 7.9 6.4 - 8.2 g/dL    Albumin 4.2 3.4 - 5.0 g/dL    Globulin 3.7 2.0 - 4.0 g/dL    Albumin/Globulin Ratio 1.1 0.8 - 1.7     Lipase    Collection Time: 07/21/23  4:40 PM   Result Value Ref Range    Lipase 82 73 - 393 U/L   Protime-INR    Collection Time: 07/21/23  4:40 PM   Result Value Ref Range    Protime 12.7 11.5 - 15.2 sec    INR 0.9 0.8 - 1.2     APTT    Collection Time: 07/21/23  4:40 PM   Result Value Ref Range    PTT 26.1 23.0 - 36.4 SEC   TYPE AND SCREEN    Collection Time: 07/21/23  4:40 PM   Result Value Ref Range    Crossmatch expiration date 07/24/2023,5964     ABO/Rh A POSITIVE     Antibody Screen NEG    Occult Blood, Fecal    Collection Time: 07/21/23  4:44 PM   Result Value Ref Range    POC Occult Blood, Fecal Positive (A) NEG     EKG 12 Lead    Collection Time: 07/21/23  4:48 PM   Result Value Ref Range    Ventricular Rate 86 BPM    Atrial Rate 86 BPM    P-R Interval 118 ms    QRS Duration 80

## 2023-07-21 NOTE — DISCHARGE INSTRUCTIONS
Additional labs pending, will call if additional treatment is needed  Medications as prescribed  Do not take any NSAID medications (ibuprofen, naproxen, Advil, aspirin)  Do not drink alcohol  Follow-up with your PCM, call for appointment  Return to care for worsening abdominal pain, intractable vomiting, increase in bleeding, lightheadedness or dizziness, chest pain, shortness of breath or other concerning symptoms

## 2023-07-21 NOTE — ED NOTES
Patient states she passes out with blood draws; labs deferred until IV can be started.       Amaya Smith RN  07/21/23 [id number]

## 2023-07-23 LAB
BACTERIA SPEC CULT: ABNORMAL
BACTERIA SPEC CULT: ABNORMAL
CC UR VC: ABNORMAL
SERVICE CMNT-IMP: ABNORMAL

## 2023-07-24 LAB
BACTERIA SPEC CULT: ABNORMAL
BACTERIA SPEC CULT: ABNORMAL
C TRACH RRNA SPEC QL NAA+PROBE: NEGATIVE
CC UR VC: ABNORMAL
N GONORRHOEA RRNA SPEC QL NAA+PROBE: NEGATIVE
SERVICE CMNT-IMP: ABNORMAL
SPECIMEN SOURCE: NORMAL
T VAGINALIS RRNA SPEC QL NAA+PROBE: NEGATIVE

## 2023-07-25 LAB
H PYLORI IGA SER-ACNC: NORMAL UNITS
H PYLORI IGG SER IA-ACNC: 0.21 INDEX VALUE (ref 0–0.79)
H PYLORI IGM SER-ACNC: <9 UNITS (ref 0–8.9)

## 2023-08-02 LAB
H PYLORI IGA SER-ACNC: <9 UNITS (ref 0–8.9)
H PYLORI IGG SER IA-ACNC: 0.21 INDEX VALUE (ref 0–0.79)
H PYLORI IGM SER-ACNC: <9 UNITS (ref 0–8.9)